# Patient Record
Sex: FEMALE | Race: WHITE | NOT HISPANIC OR LATINO | Employment: UNEMPLOYED | URBAN - METROPOLITAN AREA
[De-identification: names, ages, dates, MRNs, and addresses within clinical notes are randomized per-mention and may not be internally consistent; named-entity substitution may affect disease eponyms.]

---

## 2021-01-05 ENCOUNTER — TELEPHONE (OUTPATIENT)
Dept: NEUROSURGERY | Facility: CLINIC | Age: 56
End: 2021-01-05

## 2021-01-05 NOTE — TELEPHONE ENCOUNTER
"Provider:  None-NP referral in epic  Caller: patient  Time of call:   11:33  Phone #:  [phone]  Surgery:    Surgery Date:    Last visit:  none   Next visit:     DRU:         Reason for call:   We have rec'd a referral for patient to be seen.    He pain management physician has her scheduled this Friday for \"a shock in her back!\"  She said it is for pain.    She is worried about having this procedure due to her diagnoses of Chiari 1 malformation.  She was hoping to get in to see one of our Providers prior to this procedure.    Please make apt for the earliest convenience.  Patient states if she does not do what PM has advised they will discharge her.      She wants to know if we can call them and cancel this apt until she is evaluated here.  "

## 2021-01-05 NOTE — TELEPHONE ENCOUNTER
There is no actual referral for this patient. I have spoken with [facility] and she has confirmed Dr. Herbert has not referred the patient to neurosurgery. She is scheduled to have an GUILLERMINA on 1/82021. She is also scheduled to follow up with him 1/6/2021 for an OV. I asked her to let him know about her fears concerning the injection and to verify if he thinks the consult is necessary at this time.

## 2021-02-10 ENCOUNTER — TELEPHONE (OUTPATIENT)
Dept: NEUROSURGERY | Facility: CLINIC | Age: 56
End: 2021-02-10

## 2021-03-05 ENCOUNTER — OFFICE VISIT (OUTPATIENT)
Dept: NEUROSURGERY | Facility: CLINIC | Age: 56
End: 2021-03-05

## 2021-03-05 VITALS — TEMPERATURE: 98.1 F | WEIGHT: 217.6 LBS | RESPIRATION RATE: 15 BRPM | BODY MASS INDEX: 34.15 KG/M2 | HEIGHT: 67 IN

## 2021-03-05 DIAGNOSIS — G93.5 CHIARI MALFORMATION TYPE I (HCC): Primary | ICD-10-CM

## 2021-03-05 DIAGNOSIS — M53.9 MULTILEVEL DEGENERATIVE DISC DISEASE: ICD-10-CM

## 2021-03-05 PROCEDURE — 99204 OFFICE O/P NEW MOD 45 MIN: CPT | Performed by: NEUROLOGICAL SURGERY

## 2021-03-05 RX ORDER — HYDROCODONE BITARTRATE AND ACETAMINOPHEN 7.5; 325 MG/1; MG/1
TABLET ORAL
COMMUNITY
Start: 2021-03-03 | End: 2022-05-04

## 2021-03-05 RX ORDER — METOPROLOL SUCCINATE 50 MG/1
50 TABLET, EXTENDED RELEASE ORAL DAILY
COMMUNITY

## 2021-03-05 RX ORDER — TIZANIDINE 4 MG/1
TABLET ORAL
COMMUNITY
Start: 2021-02-24 | End: 2022-04-13 | Stop reason: SDUPTHER

## 2021-03-05 RX ORDER — HYDROCHLOROTHIAZIDE 25 MG/1
25 TABLET ORAL DAILY
COMMUNITY
Start: 2021-02-01 | End: 2022-09-13

## 2021-03-05 NOTE — PROGRESS NOTES
NAME: CHILO SOLORIO   DOS: 3/5/2021  : 1965  PCP: Andreas Casarez MD    Chief Complaint:    Chief Complaint   Patient presents with   • Low back & neck pain   • Headaches, visual disturbance   • Hearing Loss       History of Present Illness:  55 y.o. female   I saw Chilo acquire neurosurgical consultation she is a 55-year-old working woman with a history of multiple medical complaints she has a history of headaches arthritic symptoms in her neck her low back problems sleeping chronic sinusitis visual complaints including changes of color vision.  She has been to 3 separate ophthalmologist who stated that they cannot diagnose her.  She has multiple symptoms including daily pain but clearly denies any changes other than memory in terms of her generalized function she is here to be evaluated      PMHX  Allergies:  Allergies   Allergen Reactions   • Gabapentin Other (See Comments)     TBD     • Mobic [Meloxicam] Other (See Comments)     TBD     Medications    Current Outpatient Medications:   •  metoprolol succinate XL (TOPROL-XL) 50 MG 24 hr tablet, Take 50 mg by mouth Daily., Disp: , Rfl:   •  hydroCHLOROthiazide (HYDRODIURIL) 25 MG tablet, Take 25 mg by mouth Daily., Disp: , Rfl:   •  HYDROcodone-acetaminophen (NORCO) 7.5-325 MG per tablet, , Disp: , Rfl:   •  tiZANidine (ZANAFLEX) 4 MG tablet, TAKE 1 TO 2 TABLETS BY MOUTH AT BEDTIME ORALLY 30 DAY(S), Disp: , Rfl:   Past Medical History:  Past Medical History:   Diagnosis Date   • Headache    • Hearing loss    • Hypertension      Past Surgical History:  Past Surgical History:   Procedure Laterality Date   • HYSTERECTOMY     • KNEE ACL RECONSTRUCTION     • OTHER SURGICAL HISTORY      Bowel/Bladder tact   • TONSILLECTOMY     • TUBAL ABDOMINAL LIGATION     • VOCAL CORD MASS EXCISION  2019     Social Hx:  Social History     Tobacco Use   • Smoking status: Current Every Day Smoker   • Smokeless tobacco: Never Used   Substance Use Topics   • Alcohol use: Never      Frequency: Never   • Drug use: Never     Family Hx:  History reviewed. No pertinent family history.  Review of Systems:        Review of Systems   Constitutional: Positive for fatigue. Negative for activity change, appetite change, chills, diaphoresis, fever and unexpected weight change.   HENT: Positive for drooling, ear pain, hearing loss, postnasal drip, sinus pressure and voice change. Negative for congestion, dental problem, ear discharge, facial swelling, mouth sores, nosebleeds, rhinorrhea, sneezing, sore throat, tinnitus and trouble swallowing.    Eyes: Positive for visual disturbance. Negative for photophobia, pain, discharge, redness and itching.   Respiratory: Negative for apnea, cough, choking, chest tightness, shortness of breath, wheezing and stridor.    Cardiovascular: Positive for leg swelling. Negative for chest pain and palpitations.   Gastrointestinal: Positive for diarrhea and nausea. Negative for abdominal distention, abdominal pain, anal bleeding, blood in stool, constipation, rectal pain and vomiting.   Endocrine: Positive for cold intolerance and heat intolerance. Negative for polydipsia, polyphagia and polyuria.   Genitourinary: Negative for decreased urine volume, difficulty urinating, dysuria, enuresis, flank pain, frequency, genital sores, hematuria and urgency.   Musculoskeletal: Positive for back pain, neck pain and neck stiffness. Negative for arthralgias, gait problem, joint swelling and myalgias.   Skin: Negative for color change, pallor, rash and wound.   Allergic/Immunologic: Positive for environmental allergies. Negative for food allergies and immunocompromised state.   Neurological: Positive for dizziness, tremors, weakness, light-headedness, numbness and headaches. Negative for seizures, syncope, facial asymmetry and speech difficulty.   Hematological: Negative for adenopathy. Bruises/bleeds easily.   Psychiatric/Behavioral: Positive for agitation. Negative for behavioral  problems, confusion, decreased concentration, dysphoric mood, hallucinations, self-injury, sleep disturbance and suicidal ideas. The patient is not nervous/anxious and is not hyperactive.    All other systems reviewed and are negative.     I have reviewed this note template and all pertinent parts of the review of systems social, family history, surgical history and medication list      Physical Examination:  Vitals:    03/05/21 1106   Resp: 15   Temp: 98.1 °F (36.7 °C)      General Appearance:   Well developed, well nourished, well groomed, alert, and cooperative.  Neurological examination:  Neurologic Exam  Vital signs were reviewed and documented in the chart  Patient appeared in good neurologic function with normal comprehension fluent speech  Mood and affect are normal  Sense of smell deferred    Pupils symmetric equally reactive funduscopic exam not visualized   Visual fields intact to confrontation  Extraocular movements intact  Face motor function is symmetric  Facial sensations normal  Hearing intact to finger rub hearing intact to finger rub  Tongue is midline  Palate symmetric  Swallowing normal  Shoulder shrug normal  Runny nose mild hoarseness of voice but tongue and face are all symmetric with normal phonation  Muscle bulk and tone normal  5 out of 5 strength no motor drift arthritic movements in arms no motor drift  Gait normal intact wide-based but stable no evidence of clear-cut instability or gait abnormality  Vibratory sensations normal at the hands  Negative Romberg  No clonus long tract signs or myelopathy    Reflexes symmetric-strictly no hyperreflexia  1+ edema noted and extremities skin appears normal        Review of Imaging/DATA:  I reviewed the MRI personally of the cervical spine, lumbar spine the cervical spine and brain demonstrate a very modest Chiari malformation the foramen magnum appears relatively open there is no compression in the tonsils really are on the borderline of  cerebellar ptosis versus a true Chiari malformation there are just above the arch of C1 may be 6 to 8 mm    There is no stenosis at all see a syrinx her lumbar spine shows right lateral recess mild to moderate stenosis with degenerative changes  Diagnoses/Plan:    Ms. Campuzano is a 55 y.o. female   1.  Chiari malformation-cerebellar ptosis no evidence of hydrocephalus, with broad ranging symptoms dizziness memory disorders very nonspecific-I do not think any of these symptoms are clearly related she does not have classic Valsalva induced headaches vertigo etc. extraocular movements intact and facial issues are all normal as well-I think this can be clinically monitored I am and have her see a PA in a year for a follow-up exam just to check for myelopathy cranial nerve disorders etc. if that is normal we can defer imaging-if there are symptoms we could repeat an MRI of the cervical and brain to look for syrinx-if symptoms become a problematic we could refer to neurology she does not wish to proceed with that right now    2.  Arthritic lumbar degenerative disc and cervical issues counseled on weight loss smoking cessation exercise PT etc.    We will see back for clinical exam with one of my extenders just to ensure no development of clinical myelopathy of absence we can defer further imaging-recommended ongoing ophthalmologic follow-up etc.

## 2022-01-27 ENCOUNTER — TELEPHONE (OUTPATIENT)
Dept: NEUROLOGY | Facility: CLINIC | Age: 57
End: 2022-01-27

## 2022-01-27 NOTE — TELEPHONE ENCOUNTER
"PTS SON CALLING STATED PT HAD CANCELLED APPT FOR TODAY BECAUSE SHE RECEIVED A CALL FROM SOMEONE UNCLEAR WHO STATING WE HAD NOT RECEIVED HER \"RESSULTS\" I ASKED WHAT TYPE OF RESULTS AS NO LABS IN MEDIA I DO SEE SOME MRI RESULTS SCANNED IN SON STATED HE WOULD CHECK WITH PT AND ALL BACK     "

## 2022-04-13 ENCOUNTER — OFFICE VISIT (OUTPATIENT)
Dept: FAMILY MEDICINE CLINIC | Facility: CLINIC | Age: 57
End: 2022-04-13

## 2022-04-13 ENCOUNTER — TELEPHONE (OUTPATIENT)
Dept: NEUROSURGERY | Facility: CLINIC | Age: 57
End: 2022-04-13

## 2022-04-13 VITALS
SYSTOLIC BLOOD PRESSURE: 126 MMHG | DIASTOLIC BLOOD PRESSURE: 68 MMHG | BODY MASS INDEX: 32.18 KG/M2 | WEIGHT: 205 LBS | OXYGEN SATURATION: 97 % | HEIGHT: 67 IN | HEART RATE: 75 BPM

## 2022-04-13 DIAGNOSIS — G93.5 CHIARI I MALFORMATION: ICD-10-CM

## 2022-04-13 DIAGNOSIS — M25.511 CHRONIC PAIN OF BOTH SHOULDERS: ICD-10-CM

## 2022-04-13 DIAGNOSIS — G89.29 CHRONIC PAIN OF BOTH SHOULDERS: ICD-10-CM

## 2022-04-13 DIAGNOSIS — M25.512 CHRONIC PAIN OF BOTH SHOULDERS: ICD-10-CM

## 2022-04-13 DIAGNOSIS — M79.7 FIBROMYALGIA: Primary | ICD-10-CM

## 2022-04-13 DIAGNOSIS — G89.29 CHRONIC BILATERAL LOW BACK PAIN WITHOUT SCIATICA: ICD-10-CM

## 2022-04-13 DIAGNOSIS — M54.50 CHRONIC BILATERAL LOW BACK PAIN WITHOUT SCIATICA: ICD-10-CM

## 2022-04-13 PROCEDURE — 96372 THER/PROPH/DIAG INJ SC/IM: CPT | Performed by: FAMILY MEDICINE

## 2022-04-13 PROCEDURE — 99214 OFFICE O/P EST MOD 30 MIN: CPT | Performed by: FAMILY MEDICINE

## 2022-04-13 RX ORDER — TRIAMCINOLONE ACETONIDE 40 MG/ML
80 INJECTION, SUSPENSION INTRA-ARTICULAR; INTRAMUSCULAR ONCE
Status: DISCONTINUED | OUTPATIENT
Start: 2022-04-13 | End: 2022-04-13

## 2022-04-13 RX ORDER — MELOXICAM 15 MG/1
15 TABLET ORAL DAILY
COMMUNITY

## 2022-04-13 RX ORDER — AMITRIPTYLINE HYDROCHLORIDE 25 MG/1
25 TABLET, FILM COATED ORAL NIGHTLY
Qty: 30 TABLET | Refills: 3 | Status: SHIPPED | OUTPATIENT
Start: 2022-04-13

## 2022-04-13 RX ORDER — TIZANIDINE 4 MG/1
4 TABLET ORAL 3 TIMES DAILY
Qty: 90 TABLET | Refills: 2 | Status: SHIPPED | OUTPATIENT
Start: 2022-04-13 | End: 2023-03-24

## 2022-04-13 RX ORDER — KETOROLAC TROMETHAMINE 30 MG/ML
60 INJECTION, SOLUTION INTRAMUSCULAR; INTRAVENOUS DAILY
Status: COMPLETED | OUTPATIENT
Start: 2022-04-13 | End: 2022-04-13

## 2022-04-13 RX ADMIN — KETOROLAC TROMETHAMINE 60 MG: 30 INJECTION, SOLUTION INTRAMUSCULAR; INTRAVENOUS at 15:30

## 2022-04-13 NOTE — PROGRESS NOTES
"Chief Complaint  Shoulder Pain    Subjective          Radha Campuzano presents to [facility]  Patient reports she comes in today stating that she needs pain medication.  She states she has not seen her neurosurgeon her neurologist in a while and stopped seeing her pain specialist when they wanted to do injections.  She states she refused injections and refused to get her medications done a regular basis so she was discharged from the practice.  She states otherwise she still having lots of shoulder pains neck pain lower back pains muscle aches and just general myalgias constantly.  She states that nothing has helped in the recent past.  She states she has been intolerant to gabapentin in the past and had an allergic response.  And states that some of the other medications have bothered her as well      Objective   Vital Signs:   /68 (BP Location: Right arm, Patient Position: Sitting, Cuff Size: Large Adult)   Pulse 75   Ht 170.2 cm (67\")   Wt 93 kg (205 lb)   SpO2 97%   BMI 32.11 kg/m²     Body mass index is 32.11 kg/m².    Review of Systems   Constitutional: Negative.    HENT: Negative for congestion, dental problem, ear discharge, ear pain and sore throat.    Respiratory: Negative for apnea, chest tightness and shortness of breath.    Gastrointestinal: Negative for constipation and nausea.   Endocrine: Negative for polyuria.   Genitourinary: Negative for difficulty urinating.   Musculoskeletal: Positive for arthralgias, back pain, myalgias and neck pain. Negative for gait problem.   Skin: Negative for rash.   Hematological: Negative for adenopathy.   Psychiatric/Behavioral: Positive for depressed mood.       Past History:  Medical History: has a past medical history of Gestational diabetes, Headache, Hearing loss, Hyperlipidemia, Hypertension, Knee injury, and Tonsillitis.   Surgical History: has a past surgical history that includes Tonsillectomy; Tubal ligation; Anterior " cruciate ligament repair; Hysterectomy; Other surgical history; and VOCAL CORD MASS EXCISION (2019).         Current Outpatient Medications:   •  hydroCHLOROthiazide (HYDRODIURIL) 25 MG tablet, Take 25 mg by mouth Daily., Disp: , Rfl:   •  meloxicam (MOBIC) 15 MG tablet, Take 15 mg by mouth Daily., Disp: , Rfl:   •  metoprolol succinate XL (TOPROL-XL) 50 MG 24 hr tablet, Take 50 mg by mouth Daily., Disp: , Rfl:   •  tiZANidine (ZANAFLEX) 4 MG tablet, Take 1 tablet by mouth 3 (Three) Times a Day., Disp: 90 tablet, Rfl: 2  •  amitriptyline (ELAVIL) 25 MG tablet, Take 1 tablet by mouth Every Night., Disp: 30 tablet, Rfl: 3  •  HYDROcodone-acetaminophen (NORCO) 7.5-325 MG per tablet, , Disp: , Rfl:     Allergies: Gabapentin    Physical Exam  Vitals reviewed.   Constitutional:       Appearance: Normal appearance.   HENT:      Head: Normocephalic and atraumatic.      Right Ear: Tympanic membrane, ear canal and external ear normal.      Left Ear: Tympanic membrane, ear canal and external ear normal.      Nose: Nose normal.      Mouth/Throat:      Mouth: Mucous membranes are moist.   Eyes:      Extraocular Movements: Extraocular movements intact.      Conjunctiva/sclera: Conjunctivae normal.      Pupils: Pupils are equal, round, and reactive to light.   Cardiovascular:      Rate and Rhythm: Normal rate and regular rhythm.   Pulmonary:      Effort: Pulmonary effort is normal.      Breath sounds: Normal breath sounds.   Abdominal:      General: Abdomen is flat. Bowel sounds are normal.      Palpations: Abdomen is soft.   Musculoskeletal:         General: Normal range of motion.      Comments: Patient complains of pains of her knees bilateral shoulder neck and lumbar spine at this time she also states her thighs are tender as well   Feet:      Right foot:      Protective Sensation: 0 sites tested. 0 sites sensed.      Toenail Condition: Right toenails are normal.      Left foot:      Protective Sensation: 0 sites tested. 0  sites sensed.      Toenail Condition: Left toenails are normal.   Skin:     General: Skin is warm and dry.   Neurological:      General: No focal deficit present.      Mental Status: She is alert and oriented to person, place, and time. Mental status is at baseline.   Psychiatric:         Behavior: Behavior normal.         Thought Content: Thought content normal.         Judgment: Judgment normal.          Result Review :                   Assessment and Plan    Diagnoses and all orders for this visit:    1. Fibromyalgia (Primary)  Comments:  Has been on medications and failed them in the past.  We will try amitriptyline at night and monitor    2. Chronic pain of both shoulders  Comments:  Toradol shot discussed steroid injections and recheck with orthopedics  Orders:  -     Discontinue: triamcinolone acetonide (KENALOG-40) injection 80 mg  -     ketorolac (TORADOL) injection 60 mg    3. Chronic bilateral low back pain without sciatica  Comments:  She needs to get her records from pain management and consider going to new pain management for further evaluation    4. Chiari I malformation (HCC)  Comments:  Months postop follow-up in March back with neurosurgery but has not made appointments and also has not made appointments to follow back with neurology    Other orders  -     tiZANidine (ZANAFLEX) 4 MG tablet; Take 1 tablet by mouth 3 (Three) Times a Day.  Dispense: 90 tablet; Refill: 2  -     amitriptyline (ELAVIL) 25 MG tablet; Take 1 tablet by mouth Every Night.  Dispense: 30 tablet; Refill: 3              Follow Up   Return if symptoms worsen or fail to improve.  Patient was given instructions and counseling regarding her condition or for health maintenance advice. Please see specific information pulled into the AVS if appropriate.     Magnus Dubose MD

## 2022-04-13 NOTE — TELEPHONE ENCOUNTER
Caller: Radha Campuzano    Relationship to patient: Self    Best call back number: [phone]  Chief complaint: CHIARI MALFORMATION    Type of visit: FOLLOW UP    Requested date: ASAP    PATIENT WAS IN DR. FRAZIER'S OFFICE AND STATES THAT THE  CALLED DR. SINGH AND WAS ADVISED TO FOLLOW UP WITH OUR OFFICE, PATIENT IS REQUESTING AN APPT AT [place] LOCATION.  PLEASE CALL THE PATIENT TO SCHEDULE, PATIENT WAS ASKING IF ANY NEW IMAGING WAS NEEDED.

## 2022-04-19 NOTE — TELEPHONE ENCOUNTER
"Caller: Radha Campuzano    Relationship to patient: Self    Best call back number: [phone]    PT CALLED TO CANCEL APPOINTMENT FOR TODAY 4/19/2022 AT 4PM WITH DR. SINGH AT [address] [place] [address].  PT CALLED TO CANCEL DUE TO DEATH IN THE FAMILY.     I EXPLAINED TO PT SHE DID NOT HAVE AN APPT TODAY WITH DR. SINGH OR AN APPT ANY TIME IN THE FUTURE.      PT READ TEXT MESSAGE WITH THE EXACT INFO SHE WAS SAYING. \"4/19/2022 AT 4PM WITH DR. SINGH AT [address] [place] [address]. \"    AFTER REVIEWING PT CHART, THERE IS AN APPT SCHEDULED FOR TODAY WITH DR.DANIEL JANET JUAREZ AT 4PM AT [address] [place] [address].    SCHEDULED APPT WITH DR. SINGH IN [place] ON 5/6/2022 BUT THEN READ TELEPHONE ENCOUNTER FROM 4/13/2022.  \"PATIENT WAS IN DR. FRAZIER'S OFFICE AND STATES THAT THE DRCollin CALLED DR. SINGH AND WAS ADVISED TO FOLLOW UP WITH OUR OFFICE, PATIENT IS REQUESTING AN APPT AT [place] LOCATION.  PLEASE CALL THE PATIENT TO SCHEDULE, PATIENT WAS ASKING IF ANY NEW IMAGING WAS NEEDED.\"    CANCELLED APPT ON 5/6/22 AND INFORMED PT IT WAS CANCELLED UNTIL A RESPONSE REGARDING IMAGING.   I ALSO TOLD PT SHE HAD AN APPT WITH DR. JUAREZ WITH [facility] AT THE [address] [place] [address].    PER DR. SINGH NOTE FROM 3/5/2021: \"I think this can be clinically monitored I am and have her see a PA in a year for a follow-up exam just to check for myelopathy cranial nerve disorders etc\"    PLEASE REVIEW AND CONTACT PT TO SCHEDULE.      "

## 2022-04-20 NOTE — TELEPHONE ENCOUNTER
Patient wants to be seen in [place], is there any imaging that she needs? Or should she come in [place] and see a PA-C?

## 2022-04-26 NOTE — TELEPHONE ENCOUNTER
Tried to call patient to schedule with a PA-C in [place] on a Parnell day for a work up. LVM for a return call.

## 2022-05-02 ENCOUNTER — TELEPHONE (OUTPATIENT)
Dept: NEUROSURGERY | Facility: CLINIC | Age: 57
End: 2022-05-02

## 2022-05-02 NOTE — TELEPHONE ENCOUNTER
Provider:  Parmjit  Caller: patient  Time of call:   9:16  Phone #:  [phone]  Surgery:  na  Surgery Date:  na  Last visit:   3-5-21  Next visit: DU GONSALES:         Reason for call:   Patient called and said that she could not make the appointment today because she just got the message yesterday. She needs it to be in [place] with Dr. Parnell. I have called and explained to her that she will need to see a PA first here in [place] to get worked up and then go from there.  She was ok with this I have told Jolene to cancel her appointment today and get her rescheduled for another day and let her know. Thank you.

## 2022-05-04 ENCOUNTER — OFFICE VISIT (OUTPATIENT)
Dept: FAMILY MEDICINE CLINIC | Facility: CLINIC | Age: 57
End: 2022-05-04

## 2022-05-04 VITALS
DIASTOLIC BLOOD PRESSURE: 90 MMHG | OXYGEN SATURATION: 99 % | SYSTOLIC BLOOD PRESSURE: 160 MMHG | HEART RATE: 77 BPM | HEIGHT: 67 IN | BODY MASS INDEX: 31.58 KG/M2 | WEIGHT: 201.2 LBS

## 2022-05-04 DIAGNOSIS — M54.41 CHRONIC BILATERAL LOW BACK PAIN WITH BILATERAL SCIATICA: ICD-10-CM

## 2022-05-04 DIAGNOSIS — M79.7 FIBROMYALGIA: ICD-10-CM

## 2022-05-04 DIAGNOSIS — G89.29 CHRONIC BILATERAL LOW BACK PAIN WITH BILATERAL SCIATICA: ICD-10-CM

## 2022-05-04 DIAGNOSIS — M54.42 CHRONIC BILATERAL LOW BACK PAIN WITH BILATERAL SCIATICA: ICD-10-CM

## 2022-05-04 DIAGNOSIS — M54.2 CERVICALGIA: ICD-10-CM

## 2022-05-04 DIAGNOSIS — I10 PRIMARY HYPERTENSION: Primary | ICD-10-CM

## 2022-05-04 PROCEDURE — 99214 OFFICE O/P EST MOD 30 MIN: CPT | Performed by: FAMILY MEDICINE

## 2022-05-04 RX ORDER — DULOXETIN HYDROCHLORIDE 60 MG/1
60 CAPSULE, DELAYED RELEASE ORAL DAILY
Qty: 90 CAPSULE | Refills: 1 | Status: SHIPPED | OUTPATIENT
Start: 2022-05-04

## 2022-05-04 NOTE — PROGRESS NOTES
"Chief Complaint  Back Pain and Fibromyalgia    Subjective          Radha Campuzano presents to [facility]  Patient comes in today stating her fibromyalgia is slightly better she is still having some neck and shoulder pain they are going to do an MRI next week on her neck she is also continue to see the lumbar spine doctor as well and they continue to have her some treatments for her as well      Objective   Vital Signs:   /90   Pulse 77   Ht 170.2 cm (67\")   Wt 91.3 kg (201 lb 3.2 oz)   SpO2 99%   BMI 31.51 kg/m²     Body mass index is 31.51 kg/m².    Review of Systems   Constitutional: Positive for fatigue.   HENT: Negative for congestion, dental problem, ear discharge, ear pain and sore throat.    Respiratory: Negative for apnea, chest tightness and shortness of breath.    Gastrointestinal: Negative for constipation and nausea.   Endocrine: Negative for polyuria.   Genitourinary: Negative for difficulty urinating.   Musculoskeletal: Positive for arthralgias, back pain, myalgias, neck pain and neck stiffness. Negative for gait problem.   Skin: Negative for rash.   Hematological: Negative for adenopathy.   Psychiatric/Behavioral: Positive for depressed mood.       Past History:  Medical History: has a past medical history of Gestational diabetes, Headache, Hearing loss, Hyperlipidemia, Hypertension, Knee injury, and Tonsillitis.   Surgical History: has a past surgical history that includes Tonsillectomy; Tubal ligation; Anterior cruciate ligament repair; Hysterectomy; Other surgical history; and VOCAL CORD MASS EXCISION (2019).         Current Outpatient Medications:   •  amitriptyline (ELAVIL) 25 MG tablet, Take 1 tablet by mouth Every Night., Disp: 30 tablet, Rfl: 3  •  hydroCHLOROthiazide (HYDRODIURIL) 25 MG tablet, Take 25 mg by mouth Daily., Disp: , Rfl:   •  meloxicam (MOBIC) 15 MG tablet, Take 15 mg by mouth Daily., Disp: , Rfl:   •  metoprolol succinate XL (TOPROL-XL) " 50 MG 24 hr tablet, Take 50 mg by mouth Daily., Disp: , Rfl:   •  tiZANidine (ZANAFLEX) 4 MG tablet, Take 1 tablet by mouth 3 (Three) Times a Day., Disp: 90 tablet, Rfl: 2  •  DULoxetine (CYMBALTA) 60 MG capsule, Take 1 capsule by mouth Daily., Disp: 90 capsule, Rfl: 1    Allergies: Gabapentin, Duloxetine hcl, Eggs or egg-derived products, and Meloxicam    Physical Exam  Vitals reviewed.   Constitutional:       Appearance: Normal appearance.   HENT:      Head: Normocephalic and atraumatic.      Right Ear: Tympanic membrane, ear canal and external ear normal.      Left Ear: Tympanic membrane, ear canal and external ear normal.      Nose: Nose normal.      Mouth/Throat:      Mouth: Mucous membranes are moist.   Eyes:      Extraocular Movements: Extraocular movements intact.      Conjunctiva/sclera: Conjunctivae normal.      Pupils: Pupils are equal, round, and reactive to light.   Cardiovascular:      Rate and Rhythm: Normal rate and regular rhythm.   Pulmonary:      Effort: Pulmonary effort is normal.      Breath sounds: Normal breath sounds.   Abdominal:      General: Abdomen is flat. Bowel sounds are normal.      Palpations: Abdomen is soft.   Musculoskeletal:         General: Normal range of motion.      Comments: Tenderness of neck bilateral trapezius lumbar spine tenderness bilaterally complains of pain with leg raises   Feet:      Right foot:      Protective Sensation: 0 sites tested. 0 sites sensed.      Toenail Condition: Right toenails are normal.      Left foot:      Protective Sensation: 0 sites tested. 0 sites sensed.      Toenail Condition: Left toenails are normal.   Skin:     General: Skin is warm and dry.   Neurological:      General: No focal deficit present.      Mental Status: She is alert and oriented to person, place, and time. Mental status is at baseline.   Psychiatric:         Behavior: Behavior normal.         Thought Content: Thought content normal.         Judgment: Judgment normal.           Result Review :                   Assessment and Plan    Diagnoses and all orders for this visit:    1. Primary hypertension (Primary)  Comments:  Recheck was 150/82 she says consistently at home it runs 140 over about 70 but unless she gets in pain and it can be elevated continue meds blood work  Orders:  -     Comprehensive Metabolic Panel; Future  -     Lipid Panel; Future  -     CBC & Differential; Future    2. Fibromyalgia  Comments:  Increase duloxetine to 60 mg daily and monitor  Orders:  -     DULoxetine (CYMBALTA) 60 MG capsule; Take 1 capsule by mouth Daily.  Dispense: 90 capsule; Refill: 1    3. Chronic bilateral low back pain with bilateral sciatica  Comments:  Seeing pain management and will monitor    4. Cervicalgia  Comments:  Recent injections continue amitriptyline and meloxicam              Follow Up   No follow-ups on file.  Patient was given instructions and counseling regarding her condition or for health maintenance advice. Please see specific information pulled into the AVS if appropriate.     Magnus Dubose MD

## 2022-05-05 LAB
ALBUMIN SERPL-MCNC: 4.6 G/DL (ref 3.8–4.9)
ALBUMIN/GLOB SERPL: 2.3 {RATIO} (ref 1.2–2.2)
ALP SERPL-CCNC: 79 IU/L (ref 44–121)
ALT SERPL-CCNC: 19 IU/L (ref 0–32)
AST SERPL-CCNC: 12 IU/L (ref 0–40)
BASOPHILS # BLD AUTO: 0 X10E3/UL (ref 0–0.2)
BASOPHILS NFR BLD AUTO: 0 %
BILIRUB SERPL-MCNC: 0.2 MG/DL (ref 0–1.2)
BUN SERPL-MCNC: 14 MG/DL (ref 6–24)
BUN/CREAT SERPL: 21 (ref 9–23)
CALCIUM SERPL-MCNC: 9.6 MG/DL (ref 8.7–10.2)
CHLORIDE SERPL-SCNC: 102 MMOL/L (ref 96–106)
CHOLEST SERPL-MCNC: 214 MG/DL (ref 100–199)
CO2 SERPL-SCNC: 24 MMOL/L (ref 20–29)
CREAT SERPL-MCNC: 0.66 MG/DL (ref 0.57–1)
EGFRCR SERPLBLD CKD-EPI 2021: 104 ML/MIN/1.73
EOSINOPHIL # BLD AUTO: 0.2 X10E3/UL (ref 0–0.4)
EOSINOPHIL NFR BLD AUTO: 1 %
ERYTHROCYTE [DISTWIDTH] IN BLOOD BY AUTOMATED COUNT: 12.7 % (ref 11.7–15.4)
GLOBULIN SER CALC-MCNC: 2 G/DL (ref 1.5–4.5)
GLUCOSE SERPL-MCNC: 93 MG/DL (ref 65–99)
HCT VFR BLD AUTO: 45.2 % (ref 34–46.6)
HDLC SERPL-MCNC: 67 MG/DL
HGB BLD-MCNC: 15.6 G/DL (ref 11.1–15.9)
IMM GRANULOCYTES # BLD AUTO: 0 X10E3/UL (ref 0–0.1)
IMM GRANULOCYTES NFR BLD AUTO: 0 %
LDLC SERPL CALC-MCNC: 131 MG/DL (ref 0–99)
LYMPHOCYTES # BLD AUTO: 3.8 X10E3/UL (ref 0.7–3.1)
LYMPHOCYTES NFR BLD AUTO: 28 %
MCH RBC QN AUTO: 30.6 PG (ref 26.6–33)
MCHC RBC AUTO-ENTMCNC: 34.5 G/DL (ref 31.5–35.7)
MCV RBC AUTO: 89 FL (ref 79–97)
MONOCYTES # BLD AUTO: 1.1 X10E3/UL (ref 0.1–0.9)
MONOCYTES NFR BLD AUTO: 8 %
NEUTROPHILS # BLD AUTO: 8.6 X10E3/UL (ref 1.4–7)
NEUTROPHILS NFR BLD AUTO: 63 %
PLATELET # BLD AUTO: 304 X10E3/UL (ref 150–450)
POTASSIUM SERPL-SCNC: 5.3 MMOL/L (ref 3.5–5.2)
PROT SERPL-MCNC: 6.6 G/DL (ref 6–8.5)
RBC # BLD AUTO: 5.1 X10E6/UL (ref 3.77–5.28)
SODIUM SERPL-SCNC: 140 MMOL/L (ref 134–144)
TRIGL SERPL-MCNC: 91 MG/DL (ref 0–149)
VLDLC SERPL CALC-MCNC: 16 MG/DL (ref 5–40)
WBC # BLD AUTO: 13.6 X10E3/UL (ref 3.4–10.8)

## 2022-05-06 NOTE — PROGRESS NOTES
Cholesterol was just slightly high watch this and your white count which is slightly which can be signs of inflammation or infection someplace I would recommend repeating that in about a month

## 2022-05-26 ENCOUNTER — TELEPHONE (OUTPATIENT)
Dept: FAMILY MEDICINE CLINIC | Facility: CLINIC | Age: 57
End: 2022-05-26

## 2022-05-26 RX ORDER — ONDANSETRON 4 MG/1
4 TABLET, FILM COATED ORAL EVERY 8 HOURS PRN
Qty: 24 TABLET | Refills: 0 | Status: SHIPPED | OUTPATIENT
Start: 2022-05-26 | End: 2022-06-20 | Stop reason: SDUPTHER

## 2022-05-26 NOTE — TELEPHONE ENCOUNTER
PATIENT CALLED, SHE SAID SHE SAW THAT WHITE BLOOD COUNT FROM RECENT BLOOD WORK IS HIGH AND HAS LOST 12 POUNDS SINCE THAT VISIT. SHE WOULD LIKE TO SPEAK TO SOMEONE ABOUT IT.

## 2022-06-14 ENCOUNTER — TELEPHONE (OUTPATIENT)
Dept: FAMILY MEDICINE CLINIC | Facility: CLINIC | Age: 57
End: 2022-06-14

## 2022-06-14 DIAGNOSIS — D72.9 ABNORMAL WHITE BLOOD CELL (WBC) COUNT: Primary | ICD-10-CM

## 2022-06-14 NOTE — TELEPHONE ENCOUNTER
KRISTIN PATIENT  Called in upset, needs follow up labs ordered for Dr Dubose. He told her to come in for labs, I can't find any in the system. She wants orders this week, not willing to wait for Dr Dubose to get back, SHE WANTS A CALL BACK TODAY.

## 2022-06-17 ENCOUNTER — LAB (OUTPATIENT)
Dept: FAMILY MEDICINE CLINIC | Facility: CLINIC | Age: 57
End: 2022-06-17

## 2022-06-17 DIAGNOSIS — D72.9 ABNORMAL WHITE BLOOD CELL (WBC) COUNT: ICD-10-CM

## 2022-06-17 PROCEDURE — 36415 COLL VENOUS BLD VENIPUNCTURE: CPT | Performed by: FAMILY MEDICINE

## 2022-06-18 LAB
BASOPHILS # BLD AUTO: 0 X10E3/UL (ref 0–0.2)
BASOPHILS NFR BLD AUTO: 0 %
EOSINOPHIL # BLD AUTO: 0.1 X10E3/UL (ref 0–0.4)
EOSINOPHIL NFR BLD AUTO: 1 %
ERYTHROCYTE [DISTWIDTH] IN BLOOD BY AUTOMATED COUNT: 13 % (ref 11.7–15.4)
HCT VFR BLD AUTO: 47.4 % (ref 34–46.6)
HGB BLD-MCNC: 16.1 G/DL (ref 11.1–15.9)
IMM GRANULOCYTES # BLD AUTO: 0 X10E3/UL (ref 0–0.1)
IMM GRANULOCYTES NFR BLD AUTO: 0 %
LYMPHOCYTES # BLD AUTO: 2.6 X10E3/UL (ref 0.7–3.1)
LYMPHOCYTES NFR BLD AUTO: 27 %
MCH RBC QN AUTO: 30.1 PG (ref 26.6–33)
MCHC RBC AUTO-ENTMCNC: 34 G/DL (ref 31.5–35.7)
MCV RBC AUTO: 89 FL (ref 79–97)
MONOCYTES # BLD AUTO: 0.6 X10E3/UL (ref 0.1–0.9)
MONOCYTES NFR BLD AUTO: 6 %
NEUTROPHILS # BLD AUTO: 6.2 X10E3/UL (ref 1.4–7)
NEUTROPHILS NFR BLD AUTO: 66 %
PLATELET # BLD AUTO: 265 X10E3/UL (ref 150–450)
RBC # BLD AUTO: 5.34 X10E6/UL (ref 3.77–5.28)
WBC # BLD AUTO: 9.5 X10E3/UL (ref 3.4–10.8)

## 2022-06-20 RX ORDER — ONDANSETRON 4 MG/1
4 TABLET, FILM COATED ORAL EVERY 8 HOURS PRN
Qty: 24 TABLET | Refills: 0 | Status: SHIPPED | OUTPATIENT
Start: 2022-06-20 | End: 2022-08-18

## 2022-06-20 NOTE — TELEPHONE ENCOUNTER
KRISTIN PATIENT   Patient called in to get lab results and to request more nausea medication. Still nauseous.   ONDANSETRON 4MG  [facility]

## 2022-06-24 ENCOUNTER — TELEPHONE (OUTPATIENT)
Dept: FAMILY MEDICINE CLINIC | Facility: CLINIC | Age: 57
End: 2022-06-24

## 2022-08-18 RX ORDER — ONDANSETRON 4 MG/1
TABLET, FILM COATED ORAL
Qty: 24 TABLET | Refills: 0 | Status: SHIPPED | OUTPATIENT
Start: 2022-08-18 | End: 2022-09-08 | Stop reason: SDUPTHER

## 2022-09-07 ENCOUNTER — TELEPHONE (OUTPATIENT)
Dept: FAMILY MEDICINE CLINIC | Facility: CLINIC | Age: 57
End: 2022-09-07

## 2022-09-07 NOTE — TELEPHONE ENCOUNTER
Caller: Radha Campuzano    Relationship: Self    Best call back number:[phone]    What medication are you requesting: ANTIBIOTIC    What are your current symptoms: VOMITING, DIARRHEA, COUGHING, EAR ACHE;  NEGATIVE FOR COVID;  TESTED 3 TIMES    How long have you been experiencing symptoms: 7 DAYS    Have you had these symptoms before:    [x] Yes  [] No    Have you been treated for these symptoms before:   [x] Yes  [] No    If a prescription is needed, what is your preferred pharmacy and phone number:      [facility]/pharmacy #[id number] [place] [id number] [address] - [phone] [facility] [phone]         Additional notes:

## 2022-09-08 ENCOUNTER — TELEMEDICINE (OUTPATIENT)
Dept: FAMILY MEDICINE CLINIC | Facility: CLINIC | Age: 57
End: 2022-09-08

## 2022-09-08 ENCOUNTER — TELEPHONE (OUTPATIENT)
Dept: FAMILY MEDICINE CLINIC | Facility: CLINIC | Age: 57
End: 2022-09-08

## 2022-09-08 VITALS — BODY MASS INDEX: 31.55 KG/M2 | HEIGHT: 67 IN | WEIGHT: 201 LBS

## 2022-09-08 DIAGNOSIS — R05.9 COUGH: Primary | ICD-10-CM

## 2022-09-08 DIAGNOSIS — R19.7 NAUSEA VOMITING AND DIARRHEA: ICD-10-CM

## 2022-09-08 DIAGNOSIS — R11.2 NAUSEA VOMITING AND DIARRHEA: ICD-10-CM

## 2022-09-08 PROCEDURE — 99213 OFFICE O/P EST LOW 20 MIN: CPT | Performed by: PHYSICIAN ASSISTANT

## 2022-09-08 RX ORDER — METHYLPREDNISOLONE 4 MG/1
TABLET ORAL
Qty: 19 TABLET | Refills: 0 | Status: SHIPPED | OUTPATIENT
Start: 2022-09-08 | End: 2022-09-18

## 2022-09-08 RX ORDER — DEXTROMETHORPHAN HYDROBROMIDE AND PROMETHAZINE HYDROCHLORIDE 15; 6.25 MG/5ML; MG/5ML
5 SOLUTION ORAL 4 TIMES DAILY PRN
Qty: 180 ML | Refills: 0 | Status: SHIPPED | OUTPATIENT
Start: 2022-09-08

## 2022-09-08 RX ORDER — ONDANSETRON 4 MG/1
4 TABLET, FILM COATED ORAL EVERY 8 HOURS PRN
Qty: 24 TABLET | Refills: 0 | Status: SHIPPED | OUTPATIENT
Start: 2022-09-08

## 2022-09-08 NOTE — PROGRESS NOTES
"Chief Complaint  URI (Patient reports that she has had a headache, cough and congestion for about a week. She has tested herself for covid 3 times and they have been neg.)    Subjective         Radhacarlos a Campuzano presents to [facility]  Patient reports today secondary to having cough, congestion, nausea and vomiting for the past 5 days.  Patient reports she has taken 3 COVID test and they have all been negative reports last COVID test was last night.  Patient denies any fever or chills.  Reports taking over-the-counter medication however no relief.    URI       Objective   Vital Signs:   Ht 170.2 cm (67\")   Wt 91.2 kg (201 lb)   BMI 31.48 kg/m²     Physical Exam   Constitutional: She appears well-developed and well-nourished.   Pulmonary/Chest: Effort normal.   Psychiatric: She has a normal mood and affect.     Result Review :                 Assessment and Plan    Diagnoses and all orders for this visit:    1. Cough (Primary)  Assessment & Plan:  Advised patient this is likely a viral syndrome.  Provided patient with prescription for Promethazine DM, ondansetron and advised her to start probiotics a bland diet and increase fluids as tolerated.  Discussed signs of worsening symptoms and advised ER should they occur.    Orders:  -     promethazine-dextromethorphan (PROMETHAZINE-DM) 6.25-15 MG/5ML solution; Take 5 mL by mouth 4 (Four) Times a Day As Needed for Cough. Caution sedation  Dispense: 180 mL; Refill: 0  -     methylPREDNISolone (MEDROL) 4 MG tablet; Take 3 tablets by mouth Daily for 3 days, THEN 2 tablets Daily for 3 days, THEN 1 tablet Daily for 4 days. With food  Dispense: 19 tablet; Refill: 0    2. Nausea vomiting and diarrhea  Assessment & Plan:  See plan above    Orders:  -     ondansetron (ZOFRAN) 4 MG tablet; Take 1 tablet by mouth Every 8 (Eight) Hours As Needed for Nausea or Vomiting.  Dispense: 24 tablet; Refill: 0      Follow Up   No follow-ups on file.  Patient was " given instructions and counseling regarding her condition or for health maintenance advice. Please see specific information pulled into the AVS if appropriate.     Mode of Visit: Video  Location of patient: home  Location of provider: [facility] clinic  You have chosen to receive care through a telehealth visit.  Does the patient consent to use a video/audio connection for your medical care today? Yes  The visit included audio and video interaction. No technical issues occurred during this visit.

## 2022-09-08 NOTE — ASSESSMENT & PLAN NOTE
Advised patient this is likely a viral syndrome.  Provided patient with prescription for Promethazine DM, ondansetron and advised her to start probiotics a bland diet and increase fluids as tolerated.  Discussed signs of worsening symptoms and advised ER should they occur.

## 2022-09-10 ENCOUNTER — TELEPHONE (OUTPATIENT)
Dept: FAMILY MEDICINE CLINIC | Facility: CLINIC | Age: 57
End: 2022-09-10

## 2022-09-10 NOTE — TELEPHONE ENCOUNTER
KRISTIN PATIENT   pt called and reported that she is still very sick and wanted Snow or Dr. Dubose to know and see what she can do.  I advised her of Saturday clinic and she said she was to sick to come in.  I advised pt if she was that sick she needed to go to ER.

## 2022-09-12 RX ORDER — DOXYCYCLINE HYCLATE 100 MG/1
100 CAPSULE ORAL 2 TIMES DAILY
Qty: 20 CAPSULE | Refills: 0 | Status: SHIPPED | OUTPATIENT
Start: 2022-09-12

## 2022-09-12 NOTE — TELEPHONE ENCOUNTER
.*HUB MAY READ*  Please advise patient that I have sent doxycycline and antibiotic to the pharmacy for her to take.  Also advised patient she needs to take another home COVID test.

## 2022-09-13 RX ORDER — HYDROCHLOROTHIAZIDE 25 MG/1
TABLET ORAL
Qty: 90 TABLET | Refills: 0 | Status: SHIPPED | OUTPATIENT
Start: 2022-09-13 | End: 2022-09-20

## 2022-09-15 ENCOUNTER — TELEPHONE (OUTPATIENT)
Dept: FAMILY MEDICINE CLINIC | Facility: CLINIC | Age: 57
End: 2022-09-15

## 2022-09-15 NOTE — TELEPHONE ENCOUNTER
Caller: Radha Campuzano    Relationship to patient: Self    Best call back number: [phone]     What is the call regarding:  PATIENT STATES THAT SHE CALLED IN ON Friday LETTING DR FOSTER KNOW THAT SHE WAS SICK WITH COUGH AND BLOWING OUT BROWN PHLEGHM.  SHE WAS TOLD THAT SHE COULD COME IN ON Saturday, BUT WHEN SHE CALLED, THEY SAID THAT DR FOSTER WAS NOT INT, BUT THEN THEY SAID HE WAS IN, BUT WAS LEAVING IN 15 MINUTES.  THEY DID NOT HAVE ANYBODY ELSE THAT COULD SEE HER.  I RELAYED THE MESSAGE THAT [facility]XYCYCLINE WAS CALLED IN FOR HER ON Monday.  SHE DID NOT HEAR ANYTHING FROM THE OFFICE STATING THAT IT HAD BEEN CALLED IN AND THE PHARMACY HAD NOT CALLED TO STATE THEY HAD IT READY FOR HER.  SHE IS CALLING THE PHARMACY TO SEE IF IT THE MEDICATION IS READY FOR HER.

## 2022-09-20 RX ORDER — METOPROLOL TARTRATE 50 MG/1
TABLET, FILM COATED ORAL
Qty: 60 TABLET | Refills: 2 | Status: SHIPPED | OUTPATIENT
Start: 2022-09-20

## 2022-09-20 RX ORDER — HYDROCHLOROTHIAZIDE 25 MG/1
TABLET ORAL
Qty: 90 TABLET | Refills: 0 | Status: SHIPPED | OUTPATIENT
Start: 2022-09-20

## 2022-11-03 ENCOUNTER — TELEPHONE (OUTPATIENT)
Dept: FAMILY MEDICINE CLINIC | Facility: CLINIC | Age: 57
End: 2022-11-03

## 2022-11-03 NOTE — TELEPHONE ENCOUNTER
Caller: Radha Campuzano    Relationship: Self    Best call back number: [phone]    What medication are you requesting: ANTIBIOTIC, PAIN MEDICATIONS     What are your current symptoms: NAUSEA, BROKEN TOOK OFF AT THE GUM, PAIN AND INFECTION. JAW PAIN     How long have you been experiencing symptoms: BROKE 1 WEEK AGO... SYMPTOMS STARTED 4 DAYS AGO     Have you had these symptoms before:    [] Yes  [] No    Have you been treated for these symptoms before:   [] Yes  [] No    If a prescription is needed, what is your preferred pharmacy and phone number: [facility]/PHARMACY #[id number] - [facility] [address] - [phone] [facility] [phone]      Additional notes: PATIENT CAN NOT GET INTO HER DENTIST UNTIL JANUARY 2023. SHE CALLED TO SCHEDULED A VIRTUAL FOR TODAY BUT NO ONE HAD ONE. PATIENT IS IN PAIN DUE TO A BROKEN TOOTH THAT HAS CAUSED INFECTION. PATIENT NEEDS MEDICATION TO HELP TREAT, PLEASE ADVISE AND CALL PATIENT

## 2022-11-18 DIAGNOSIS — M79.7 FIBROMYALGIA: ICD-10-CM

## 2022-11-18 RX ORDER — DULOXETIN HYDROCHLORIDE 60 MG/1
CAPSULE, DELAYED RELEASE ORAL
Qty: 90 CAPSULE | Refills: 1 | OUTPATIENT
Start: 2022-11-18

## 2023-03-24 RX ORDER — TIZANIDINE 4 MG/1
TABLET ORAL
Qty: 90 TABLET | Refills: 2 | Status: SHIPPED | OUTPATIENT
Start: 2023-03-24

## 2023-05-04 RX ORDER — HYDROCHLOROTHIAZIDE 25 MG/1
TABLET ORAL
Qty: 90 TABLET | Refills: 0 | Status: SHIPPED | OUTPATIENT
Start: 2023-05-04

## 2023-05-10 RX ORDER — METOPROLOL TARTRATE 50 MG/1
TABLET, FILM COATED ORAL
Qty: 60 TABLET | Refills: 3 | OUTPATIENT
Start: 2023-05-10